# Patient Record
Sex: FEMALE | Race: OTHER | URBAN - METROPOLITAN AREA
[De-identification: names, ages, dates, MRNs, and addresses within clinical notes are randomized per-mention and may not be internally consistent; named-entity substitution may affect disease eponyms.]

---

## 2017-06-13 ENCOUNTER — EMERGENCY (EMERGENCY)
Facility: HOSPITAL | Age: 47
LOS: 1 days | Discharge: HOME | End: 2017-06-13
Attending: EMERGENCY MEDICINE | Admitting: EMERGENCY MEDICINE
Payer: COMMERCIAL

## 2017-06-13 VITALS
SYSTOLIC BLOOD PRESSURE: 123 MMHG | OXYGEN SATURATION: 100 % | DIASTOLIC BLOOD PRESSURE: 74 MMHG | TEMPERATURE: 99 F | HEART RATE: 94 BPM | RESPIRATION RATE: 18 BRPM

## 2017-06-13 VITALS
SYSTOLIC BLOOD PRESSURE: 120 MMHG | OXYGEN SATURATION: 96 % | RESPIRATION RATE: 16 BRPM | TEMPERATURE: 99 F | DIASTOLIC BLOOD PRESSURE: 76 MMHG | HEART RATE: 96 BPM

## 2017-06-13 PROCEDURE — 99284 EMERGENCY DEPT VISIT MOD MDM: CPT | Mod: 25

## 2017-06-13 PROCEDURE — 99284 EMERGENCY DEPT VISIT MOD MDM: CPT

## 2017-06-13 PROCEDURE — 93971 EXTREMITY STUDY: CPT | Mod: 26

## 2017-06-13 PROCEDURE — 93971 EXTREMITY STUDY: CPT

## 2017-06-13 RX ORDER — IBUPROFEN 200 MG
600 TABLET ORAL ONCE
Qty: 0 | Refills: 0 | Status: COMPLETED | OUTPATIENT
Start: 2017-06-13 | End: 2017-06-13

## 2017-06-13 RX ADMIN — Medication 600 MILLIGRAM(S): at 19:29

## 2017-06-13 NOTE — ED PROVIDER NOTE - OBJECTIVE STATEMENT
46 y.o. female hx of DVT and PE (unclear but states she thinks she may have a coagulation d/o) in 2014, no longer on anticoagulation p/w LLE swelling and pain. Patient states that since Friday she has had pain and decreased ROM of the left knee. She denies injury or trauma to the leg. Today she noticed pain in the calf area as well, and given her history she decided to come in. Pain occurs with walking. No recent travel or immobilization. Denies chest pain, SOB, fevers, chills.

## 2017-06-13 NOTE — ED ADULT NURSE NOTE - OBJECTIVE STATEMENT
46 year old female A&OX3 PMHX of DVT, PE, presents with left knee, calf, and ankle pain since Friday. Patient states that pain started in the knee on Friday and has begun to radiate down the leg. Patient endorses previous DVT's and PE's for which she has received a surgical filter. Patient endorses difficulty ambulating. Patient denies shortness of breath, chest pain, dizziness, weakness.

## 2017-06-13 NOTE — ED ADULT TRIAGE NOTE - CHIEF COMPLAINT QUOTE
pt states poor ROM to left knee 4 dys ago today tender and swelling left calf hx of DVT in that leg pt not on blood thinners

## 2017-06-13 NOTE — ED PROVIDER NOTE - PLAN OF CARE
1. Continue all home medications as previously prescribed. You will need to have a repeat ultrasound in 5-7 days.  2. Follow up with your Primary Care Physician as soon as possible for further evaluation.   3. Return to the Emergency Department for any concerning symptoms.

## 2017-06-13 NOTE — ED PROVIDER NOTE - MEDICAL DECISION MAKING DETAILS
Attending MD Trejo: 46F with history of DVT and PE presenting with atraumatic left leg swelling and knee pain, left calf with asymmetric swelling, concern for recurrent DVT. No clinical s/s concurrent however at this time, plan for US LLE to eval for recurrent DVT

## 2017-06-13 NOTE — ED PROVIDER NOTE - CARE PLAN
Principal Discharge DX:	Leg swelling  Instructions for follow-up, activity and diet:	1. Continue all home medications as previously prescribed. You will need to have a repeat ultrasound in 5-7 days.  2. Follow up with your Primary Care Physician as soon as possible for further evaluation.   3. Return to the Emergency Department for any concerning symptoms.

## 2017-06-13 NOTE — ED PROVIDER NOTE - ATTENDING CONTRIBUTION TO CARE
Attending MD Trejo:   I personally have seen and examined this patient.  Physician assistant note reviewed and agree on plan of care and except where noted.  See HPI, PE, and MDM for details.     Attending MD Trejo: A & O x 3, NAD, HEENT WNL and no facial asymmetry; lungs CTAB, heart with reg rhythm without murmur; abdomen soft NTND; extremities with mild left calf swelling, no calf ttp, negative bulmaro's sign, NV intact in b/l LEs; neuro exam non focal with no motor or sensory deficits.

## 2017-06-13 NOTE — ED PROVIDER NOTE - PROGRESS NOTE DETAILS
Attending MD Trejo: ED US negative for DVT. Will confirm findings with radiology US given high suspicion for DVT, if that is negative, patient will have repeat US performed in 5 days. Attending MD Roland: Patient signed out at 17:00 from Dr. Trejo pending official U/S.  Patient re-evaluated and doing well.  No acute issues at  this time.  Radiology tests reviewed with patient and family.  Patient stable for discharge.  Verbalized understanding of need for repeat U/S in 5 days. Follow up instructions given, importance of follow up emphasized, return to ED parameters reviewed and patient verbalized understanding.  All questions answered, all concerns addressed.

## 2017-06-13 NOTE — ED PROVIDER NOTE - PHYSICAL EXAMINATION
LLE: Mild swelling of the left knee and calf compared to the right side, prominence of the superficial vasculature, calf non-tender to palpation. knee is warm to palpation, ROM of the knee decreased due to pain. Neurovascularly intact distally.

## 2017-06-16 DIAGNOSIS — M79.662 PAIN IN LEFT LOWER LEG: ICD-10-CM

## 2017-06-16 DIAGNOSIS — M79.89 OTHER SPECIFIED SOFT TISSUE DISORDERS: ICD-10-CM

## 2017-06-16 DIAGNOSIS — F32.9 MAJOR DEPRESSIVE DISORDER, SINGLE EPISODE, UNSPECIFIED: ICD-10-CM

## 2020-02-27 ENCOUNTER — EMERGENCY (EMERGENCY)
Facility: HOSPITAL | Age: 50
LOS: 0 days | Discharge: HOME | End: 2020-02-27
Attending: EMERGENCY MEDICINE | Admitting: EMERGENCY MEDICINE
Payer: SUBSIDIZED

## 2020-02-27 VITALS
OXYGEN SATURATION: 97 % | TEMPERATURE: 100 F | DIASTOLIC BLOOD PRESSURE: 77 MMHG | HEIGHT: 65 IN | HEART RATE: 92 BPM | WEIGHT: 210.1 LBS | RESPIRATION RATE: 19 BRPM | SYSTOLIC BLOOD PRESSURE: 166 MMHG

## 2020-02-27 VITALS
SYSTOLIC BLOOD PRESSURE: 148 MMHG | HEART RATE: 85 BPM | TEMPERATURE: 98 F | OXYGEN SATURATION: 99 % | DIASTOLIC BLOOD PRESSURE: 68 MMHG | RESPIRATION RATE: 16 BRPM

## 2020-02-27 DIAGNOSIS — M79.605 PAIN IN LEFT LEG: ICD-10-CM

## 2020-02-27 DIAGNOSIS — Z79.899 OTHER LONG TERM (CURRENT) DRUG THERAPY: ICD-10-CM

## 2020-02-27 LAB
ALBUMIN SERPL ELPH-MCNC: 4.2 G/DL — SIGNIFICANT CHANGE UP (ref 3.5–5.2)
ALP SERPL-CCNC: 52 U/L — SIGNIFICANT CHANGE UP (ref 30–115)
ALT FLD-CCNC: 17 U/L — SIGNIFICANT CHANGE UP (ref 0–41)
ANION GAP SERPL CALC-SCNC: 15 MMOL/L — HIGH (ref 7–14)
APTT BLD: 29.9 SEC — SIGNIFICANT CHANGE UP (ref 27–39.2)
AST SERPL-CCNC: 36 U/L — SIGNIFICANT CHANGE UP (ref 0–41)
BASOPHILS # BLD AUTO: 0.07 K/UL — SIGNIFICANT CHANGE UP (ref 0–0.2)
BASOPHILS NFR BLD AUTO: 0.8 % — SIGNIFICANT CHANGE UP (ref 0–1)
BILIRUB SERPL-MCNC: <0.2 MG/DL — SIGNIFICANT CHANGE UP (ref 0.2–1.2)
BLD GP AB SCN SERPL QL: SIGNIFICANT CHANGE UP
BUN SERPL-MCNC: 18 MG/DL — SIGNIFICANT CHANGE UP (ref 10–20)
CALCIUM SERPL-MCNC: 9.3 MG/DL — SIGNIFICANT CHANGE UP (ref 8.5–10.1)
CHLORIDE SERPL-SCNC: 101 MMOL/L — SIGNIFICANT CHANGE UP (ref 98–110)
CO2 SERPL-SCNC: 21 MMOL/L — SIGNIFICANT CHANGE UP (ref 17–32)
CREAT SERPL-MCNC: 0.6 MG/DL — LOW (ref 0.7–1.5)
EOSINOPHIL # BLD AUTO: 0.09 K/UL — SIGNIFICANT CHANGE UP (ref 0–0.7)
EOSINOPHIL NFR BLD AUTO: 1 % — SIGNIFICANT CHANGE UP (ref 0–8)
GLUCOSE SERPL-MCNC: 112 MG/DL — HIGH (ref 70–99)
HCT VFR BLD CALC: 40 % — SIGNIFICANT CHANGE UP (ref 37–47)
HGB BLD-MCNC: 13.3 G/DL — SIGNIFICANT CHANGE UP (ref 12–16)
IMM GRANULOCYTES NFR BLD AUTO: 0.3 % — SIGNIFICANT CHANGE UP (ref 0.1–0.3)
INR BLD: 0.99 RATIO — SIGNIFICANT CHANGE UP (ref 0.65–1.3)
LYMPHOCYTES # BLD AUTO: 2.04 K/UL — SIGNIFICANT CHANGE UP (ref 1.2–3.4)
LYMPHOCYTES # BLD AUTO: 23.6 % — SIGNIFICANT CHANGE UP (ref 20.5–51.1)
MCHC RBC-ENTMCNC: 31.1 PG — HIGH (ref 27–31)
MCHC RBC-ENTMCNC: 33.3 G/DL — SIGNIFICANT CHANGE UP (ref 32–37)
MCV RBC AUTO: 93.5 FL — SIGNIFICANT CHANGE UP (ref 81–99)
MONOCYTES # BLD AUTO: 0.77 K/UL — HIGH (ref 0.1–0.6)
MONOCYTES NFR BLD AUTO: 8.9 % — SIGNIFICANT CHANGE UP (ref 1.7–9.3)
NEUTROPHILS # BLD AUTO: 5.66 K/UL — SIGNIFICANT CHANGE UP (ref 1.4–6.5)
NEUTROPHILS NFR BLD AUTO: 65.4 % — SIGNIFICANT CHANGE UP (ref 42.2–75.2)
NRBC # BLD: 0 /100 WBCS — SIGNIFICANT CHANGE UP (ref 0–0)
PLATELET # BLD AUTO: 289 K/UL — SIGNIFICANT CHANGE UP (ref 130–400)
POTASSIUM SERPL-MCNC: 6.4 MMOL/L — CRITICAL HIGH (ref 3.5–5)
POTASSIUM SERPL-SCNC: 6.4 MMOL/L — CRITICAL HIGH (ref 3.5–5)
PROT SERPL-MCNC: 7.3 G/DL — SIGNIFICANT CHANGE UP (ref 6–8)
PROTHROM AB SERPL-ACNC: 11.4 SEC — SIGNIFICANT CHANGE UP (ref 9.95–12.87)
RBC # BLD: 4.28 M/UL — SIGNIFICANT CHANGE UP (ref 4.2–5.4)
RBC # FLD: 13.2 % — SIGNIFICANT CHANGE UP (ref 11.5–14.5)
SODIUM SERPL-SCNC: 137 MMOL/L — SIGNIFICANT CHANGE UP (ref 135–146)
WBC # BLD: 8.66 K/UL — SIGNIFICANT CHANGE UP (ref 4.8–10.8)
WBC # FLD AUTO: 8.66 K/UL — SIGNIFICANT CHANGE UP (ref 4.8–10.8)

## 2020-02-27 PROCEDURE — 99284 EMERGENCY DEPT VISIT MOD MDM: CPT

## 2020-02-27 PROCEDURE — 99053 MED SERV 10PM-8AM 24 HR FAC: CPT

## 2020-02-27 RX ORDER — KETOROLAC TROMETHAMINE 30 MG/ML
1 SYRINGE (ML) INJECTION
Qty: 20 | Refills: 0
Start: 2020-02-27 | End: 2020-03-02

## 2020-02-27 RX ORDER — METHOCARBAMOL 500 MG/1
1000 TABLET, FILM COATED ORAL ONCE
Refills: 0 | Status: COMPLETED | OUTPATIENT
Start: 2020-02-27 | End: 2020-02-27

## 2020-02-27 RX ORDER — KETOROLAC TROMETHAMINE 30 MG/ML
15 SYRINGE (ML) INJECTION ONCE
Refills: 0 | Status: DISCONTINUED | OUTPATIENT
Start: 2020-02-27 | End: 2020-02-27

## 2020-02-27 RX ORDER — ACETAMINOPHEN 500 MG
650 TABLET ORAL ONCE
Refills: 0 | Status: COMPLETED | OUTPATIENT
Start: 2020-02-27 | End: 2020-02-27

## 2020-02-27 RX ORDER — METHOCARBAMOL 500 MG/1
2 TABLET, FILM COATED ORAL
Qty: 40 | Refills: 0
Start: 2020-02-27 | End: 2020-03-02

## 2020-02-27 RX ADMIN — Medication 15 MILLIGRAM(S): at 04:48

## 2020-02-27 RX ADMIN — Medication 650 MILLIGRAM(S): at 04:48

## 2020-02-27 RX ADMIN — METHOCARBAMOL 1000 MILLIGRAM(S): 500 TABLET, FILM COATED ORAL at 04:48

## 2020-02-27 NOTE — ED PROVIDER NOTE - NSFOLLOWUPINSTRUCTIONS_ED_ALL_ED_FT
Neuropathic Pain    Neuropathic pain is pain caused by damage to the nerves that are responsible for certain sensations in your body (sensory nerves). The pain can be caused by:  Damage to the sensory nerves that send signals to your spinal cord and brain (peripheral nervous system).  Damage to the sensory nerves in your brain or spinal cord (central nervous system).  Neuropathic pain can make you more sensitive to pain. Even a minor sensation can feel very painful. This is usually a long-term condition that can be difficult to treat. The type of pain differs from person to person. It may:  Start suddenly (acute), or it may develop slowly and last for a long time (chronic).  Come and go as damaged nerves heal, or it may stay at the same level for years.  Cause emotional distress, loss of sleep, and a lower quality of life.  What are the causes?  The most common cause of this condition is diabetes. Many other diseases and conditions can also cause neuropathic pain. Causes of neuropathic pain can be classified as:  Toxic. This is caused by medicines and chemicals. The most common cause of toxic neuropathic pain is damage from cancer treatments (chemotherapy).  Metabolic. This can be caused by:  Diabetes. This is the most common disease that damages the nerves.  Lack of vitamin B from long-term alcohol abuse.  Traumatic. Any injury that cuts, crushes, or stretches a nerve can cause damage and pain. A common example is feeling pain after losing an arm or leg (phantom limb pain).  Compression-related. If a sensory nerve gets trapped or compressed for a long period of time, the blood supply to the nerve can be cut off.  Vascular. Many blood vessel diseases can cause neuropathic pain by decreasing blood supply and oxygen to nerves.  Autoimmune. This type of pain results from diseases in which the body's defense system (immune system) mistakenly attacks sensory nerves. Examples of autoimmune diseases that can cause neuropathic pain include lupus and multiple sclerosis.  Infectious. Many types of viral infections can damage sensory nerves and cause pain. Shingles infection is a common cause of this type of pain.  Inherited. Neuropathic pain can be a symptom of many diseases that are passed down through families (genetic).  What increases the risk?  You are more likely to develop this condition if:  You have diabetes.  You smoke.  You drink too much alcohol.  You are taking certain medicines, including medicines that kill cancer cells (chemotherapy) or that treat immune system disorders.  What are the signs or symptoms?  The main symptom is pain. Neuropathic pain is often described as:  Burning.  Shock-like.  Stinging.  Hot or cold.  Itching.  How is this diagnosed?  No single test can diagnose neuropathic pain. It is diagnosed based on:  Physical exam and your symptoms. Your health care provider will ask you about your pain. You may be asked to use a pain scale to describe how bad your pain is.  Tests. These may be done to see if you have a high sensitivity to pain and to help find the cause and location of any sensory nerve damage. They include:  Nerve conduction studies to test how well nerve signals travel through your sensory nerves (electrodiagnostic testing).  Stimulating your sensory nerves through electrodes on your skin and measuring the response in your spinal cord and brain (somatosensory evoked potential).  Imaging studies, such as:  X-rays.  CT scan.  MRI.  How is this treated?  Treatment for neuropathic pain may change over time. You may need to try different treatment options or a combination of treatments. Some options include:  Treating the underlying cause of the neuropathy, such as diabetes, kidney disease, or vitamin deficiencies.  Stopping medicines that can cause neuropathy, such as chemotherapy.  Medicine to relieve pain. Medicines may include:  Prescription or over-the-counter pain medicine.  Anti-seizure medicine.  Antidepressant medicines.  Pain-relieving patches that are applied to painful areas of skin.  A medicine to numb the area (local anesthetic), which can be injected as a nerve block.  Transcutaneous nerve stimulation. This uses electrical currents to block painful nerve signals. The treatment is painless.  Alternative treatments, such as:  Acupuncture.  Meditation.  Massage.  Physical therapy.  Pain management programs.  Counseling.  Follow these instructions at home:  Medicines     Image   Take over-the-counter and prescription medicines only as told by your health care provider.  Do not drive or use heavy machinery while taking prescription pain medicine.  If you are taking prescription pain medicine, take actions to prevent or treat constipation. Your health care provider may recommend that you:   Drink enough fluid to keep your urine pale yellow.   Eat foods that are high in fiber, such as fresh fruits and vegetables, whole grains, and beans.   Limit foods that are high in fat and processed sugars, such as fried or sweet foods.   Take an over-the-counter or prescription medicine for constipation.  Lifestyle       Have a good support system at home.  Consider joining a chronic pain support group.  Do not use any products that contain nicotine or tobacco, such as cigarettes and e-cigarettes. If you need help quitting, ask your health care provider.  Do not drink alcohol.  General instructions     Learn as much as you can about your condition.  Work closely with all your health care providers to find the treatment plan that works best for you.  Ask your health care provider what activities are safe for you.  Keep all follow-up visits as told by your health care provider. This is important.  Contact a health care provider if:  Your pain treatments are not working.  You are having side effects from your medicines.  You are struggling with tiredness (fatigue), mood changes, depression, or anxiety.  Summary  Neuropathic pain is pain caused by damage to the nerves that are responsible for certain sensations in your body (sensory nerves).  Neuropathic pain may come and go as damaged nerves heal, or it may stay at the same level for years.  Neuropathic pain is usually a long-term condition that can be difficult to treat. Consider joining a chronic pain support group.  This information is not intended to replace advice given to you by your health care provider. Make sure you discuss any questions you have with your health care provider.

## 2020-02-27 NOTE — ED PROVIDER NOTE - NSFOLLOWUPCLINICS_GEN_ALL_ED_FT
[facility]  Neurology  [address] [place] [address], [facility] [address]  [place] [address]  Phone: [phone]  Fax:   Follow Up Time: 1-3 Days

## 2020-02-27 NOTE — ED ADULT NURSE NOTE - CHPI ED NUR SYMPTOMS NEG
no dizziness/no decreased eating/drinking/no fever/no tingling/no vomiting/no chills/no nausea/no weakness

## 2020-02-27 NOTE — ED PROVIDER NOTE - OBJECTIVE STATEMENT
48 y/o female with PMH of PE/DVT 5 years ago after surgery (treated with Xarelto/IVC filter x6 months no AC since) who presents to ED for pain in the left leg 1 day. Pt does not like to take medications so came to ED for pain. Pt was concerned she had a blood clot prompting ED visit. No chest pain or SOB. No weakness, numbness.

## 2020-02-27 NOTE — ED PROVIDER NOTE - CLINICAL SUMMARY MEDICAL DECISION MAKING FREE TEXT BOX
pt with hx of dvt co left thigh/inguinal pain. sharp radiates from upper thigh down bella-lateral thigh. no trauma. no sob or cp. no leg swelling. no calf pain.  pt in nad, cta, rrr, ab soft, nt. spine nt, no cvat. no rash. leg nt. no edema. nml pulses, cap refill < 2 sec. no cyanosis. ddimer.

## 2020-02-27 NOTE — ED PROVIDER NOTE - PHYSICAL EXAMINATION
CONSTITUTIONAL: Well-developed; well-nourished; in no acute distress.   SKIN: warm, dry  HEAD: Normocephalic; atraumatic.  EYES: no conjunctival injection. PERRL.   ENT: No nasal discharge; airway clear.  NECK: Supple; non tender.  CARD: S1, S2 normal; no murmurs, gallops, or rubs. Regular rate and rhythm.   RESP: No wheezes, rales or rhonchi.  ABD: soft ntnd  EXT: Normal ROM.  No clubbing, cyanosis or edema.   BACK: + SLR on the elft  LYMPH: No acute cervical adenopathy.  NEURO: Alert, oriented, grossly unremarkable  PSYCH: Cooperative, appropriate.

## 2020-06-10 ENCOUNTER — EMERGENCY (EMERGENCY)
Facility: HOSPITAL | Age: 50
LOS: 0 days | Discharge: HOME | End: 2020-06-10
Attending: EMERGENCY MEDICINE | Admitting: EMERGENCY MEDICINE
Payer: COMMERCIAL

## 2020-06-10 VITALS
SYSTOLIC BLOOD PRESSURE: 150 MMHG | HEIGHT: 65 IN | RESPIRATION RATE: 18 BRPM | OXYGEN SATURATION: 97 % | DIASTOLIC BLOOD PRESSURE: 70 MMHG | HEART RATE: 82 BPM | TEMPERATURE: 96 F | WEIGHT: 218.04 LBS

## 2020-06-10 DIAGNOSIS — Z98.890 OTHER SPECIFIED POSTPROCEDURAL STATES: ICD-10-CM

## 2020-06-10 DIAGNOSIS — R29.810 FACIAL WEAKNESS: ICD-10-CM

## 2020-06-10 DIAGNOSIS — H92.01 OTALGIA, RIGHT EAR: ICD-10-CM

## 2020-06-10 DIAGNOSIS — H92.09 OTALGIA, UNSPECIFIED EAR: ICD-10-CM

## 2020-06-10 DIAGNOSIS — Z98.890 OTHER SPECIFIED POSTPROCEDURAL STATES: Chronic | ICD-10-CM

## 2020-06-10 PROCEDURE — 99284 EMERGENCY DEPT VISIT MOD MDM: CPT

## 2020-06-10 PROCEDURE — 70450 CT HEAD/BRAIN W/O DYE: CPT | Mod: 26

## 2020-06-10 RX ORDER — ARIPIPRAZOLE 15 MG/1
0 TABLET ORAL
Qty: 0 | Refills: 0 | DISCHARGE

## 2020-06-10 RX ORDER — FLUOXETINE HCL 10 MG
0 CAPSULE ORAL
Qty: 0 | Refills: 0 | DISCHARGE

## 2020-06-10 NOTE — ED PROVIDER NOTE - PATIENT PORTAL LINK FT
You can access the FollowMyHealth Patient Portal offered by [facility] by registering at the following website: [email or web address]. By joining [employer]’s FollowMyHealth portal, you will also be able to view your health information using other applications (apps) compatible with our system.

## 2020-06-10 NOTE — ED PROVIDER NOTE - OBJECTIVE STATEMENT
50 y/o female with a PMH of premature ovarian failure, PE and DVT s/p surgery and on bc (no longer on blood thinners, IVC filter removed) presents to the ED for evaluation of intermittent sharp right ear pain radiating to the right side of the head, right eye tearing, associated with twitch of the right cheek, lip, and chin x 6 days. Pt states pain resolves with tylenol. pt admits to seeing neurologist Dr. Singh on monday 06/01/2020 and pt is schedule for mri of the brain on 06/12/2020. pt is not currently in pain, took two tylenol prior to arrival. Pt denies head injury, eye pain, eye swelling, decrease hearing, fever, chills, feeling off balance, numbness, tingling, neck pain, rash, hx of stroke, or n/v/d.

## 2020-06-10 NOTE — ED PROVIDER NOTE - CARE PROVIDER_API CALL
gurpreet kelsey  [address] [place] [address]  Phone: [phone]  Fax: (   )    -  Follow Up Time: Urgent

## 2020-06-10 NOTE — ED PROVIDER NOTE - CLINICAL SUMMARY MEDICAL DECISION MAKING FREE TEXT BOX
48yo F presents for ear pain and facial weakness for 1 week. Only notable neuro deficit is right lower lip weakness. CTH negative. Pt scheduled for MRI in 2 days.  D/w neuro Dr. Rajesh Valdez, agrees pt stable to continue to f/u with her neurologist as outpatient

## 2020-06-10 NOTE — ED PROVIDER NOTE - PHYSICAL EXAMINATION
Physical Exam    Vital Signs: I have reviewed the initial vital signs.  Constitutional: well-nourished, appears stated age, no acute distress  Eyes: Conjunctiva pink, Sclera clear, PERRLA, EOMI without pain.  ENT: b/l TM intact without erythema, or bulging. no auricular tenderness no surrounding erythema or edema.   Cardiovascular: regular rate, regular rhythm, well-perfused extremities, radial pulses equal and 2+ b/l.   Respiratory: unlabored respiratory effort, clear to auscultation bilaterally no wheezing, rales and rhonchi. pt is speaking full sentences. no accessory muscle use.   Integumentary: warm, dry, no rash  Neurologic: skull atraumatic normocephalic. right lower lip weakness with smiling. awake, alert, cranial nerves II-XII grossly intact, extremities’ motor and sensory functions grossly intact. finger to nose intact. negative pronator drift. negative romberg. steady gait.

## 2020-06-10 NOTE — ED ADULT NURSE NOTE - NSIMPLEMENTINTERV_GEN_ALL_ED
Implemented All Universal Safety Interventions:  [place] to call system. Call bell, personal items and telephone within reach. Instruct patient to call for assistance. Room bathroom lighting operational. Non-slip footwear when patient is off stretcher. Physically safe environment: no spills, clutter or unnecessary equipment. Stretcher in lowest position, wheels locked, appropriate side rails in place.

## 2020-06-10 NOTE — ED PROVIDER NOTE - NS ED ROS FT
CONST: No fever, chills or bodyaches  EYES: No pain, redness, drainage or visual changes.  ENT: (+) right ear pain. no ear discharge, nasal discharge or congestion. No sore throat  CARD: No chest pain, palpitations  RESP: No SOB, cough, hemoptysis. No hx of asthma or COPD  GI: No abdominal pain, N/V/D  : No urinary symptoms  MS: No joint pain, back pain or extremity pain/injury  SKIN: No rashes  NEURO: (+) right head pain, twitching of the right eye, check, and lip. No dizziness, paresthesias or LOC

## 2020-06-10 NOTE — ED PROVIDER NOTE - ATTENDING CONTRIBUTION TO CARE
48yo F with PMHx DVT/PE (2/2 OCP use/post-op complication, no longer on AC, IVC filter removed), p/w right ear pain for 1 week assoc with right eye tearing, and muscle twitches of right cheek and right lower lip weakness. Pt saw neuro recently, scheduled for MRI in 2 days, however concerned about symptoms so came to ED for eval. Denies fever/chills, headache, lightheadedness, dizziness, vertigo, blurry vision, hearing change, CP, SOB, cough, nausea, vomiting, abd pain, dysuria, leg swelling. Denies numbness, tingling, weakness. Denies slurred speech. Denies numbness, tingling, weakness in extremities.     Vital signs reviewed  GENERAL: Patient nontoxic appearing, NAD  HEAD: NCAT  EYES: Anicteric  ENT: MMM. TMs normal, no erythema, dullness, bulging.   NECK: Supple, non tender, normal ROM  RESPIRATORY: Normal respiratory effort. CTA B/L. No wheezing, rales, rhonchi  CARDIOVASCULAR: Regular rate and rhythm  ABDOMEN: Soft. Nondistended. Nontender. No guarding or rebound.   MUSCULOSKELETAL/EXTREMITIES: Brisk cap refill. Equal radial pulses. No leg edema.  SKIN:  Warm and dry  NEURO: AAOx3. GCS 15. Speech clear and coherent. Answering questions appropriately. Face symmetric, no facial droop. Slight right lower lip weakness, noticeable when asking patient to smile or when puffing out cheeks. Strength 5/5 x4, no drift. Normal finger-to-nose. No dysmetria. Ambulating normally, no gait abnormality.

## 2020-06-10 NOTE — ED PROVIDER NOTE - PROVIDER TOKENS
FREE:[LAST:[laure],FIRST:[gurpreet],PHONE:[phone]],FAX:[(   )    -],ADDRESS:[[address] [place] [address]],FOLLOWUP:[Urgent]]

## 2020-06-10 NOTE — ED PROVIDER NOTE - NSFOLLOWUPINSTRUCTIONS_ED_ALL_ED_FT
Earache    WHAT YOU NEED TO KNOW:    An earache can be caused by a problem within your ear or from another body area. Common causes include earwax buildup, objects in your ear, injury, infections, or jaw or dental problems. Less often, earaches may be caused by arthritis in your upper spine.    DISCHARGE INSTRUCTIONS:    Return to the emergency department if:     You have a severe earache.      You have ear pain with itching, hearing loss, dizziness, a feeling of fullness in your ear, or ringing in your ears.    Contact your healthcare provider if:     Your ear pain worsens or does not go away with treatment.      You have drainage from your ear.      You have a fever.       Your outer ear becomes red, swollen, and warm.      You have questions or concerns about your condition or care.    Medicines: You may need any of the following:     NSAIDs, such as ibuprofen, help decrease swelling, pain, and fever. This medicine is available with or without a doctor's order. NSAIDs can cause stomach bleeding or kidney problems in certain people. If you take blood thinner medicine, always ask if NSAIDs are safe for you. Always read the medicine label and follow directions. Do not give these medicines to children under 6 months of age without direction from your child's healthcare provider.      Acetaminophen decreases pain and fever. It is available without a doctor's order. Ask how much to take and how often to take it. Follow directions. Acetaminophen can cause liver damage if not taken correctly.      Do not give aspirin to children under 18 years of age. Your child could develop Reye syndrome if he takes aspirin. Reye syndrome can cause life-threatening brain and liver damage. Check your child's medicine labels for aspirin, salicylates, or oil of wintergreen.       Take your medicine as directed. Call your healthcare provider if you think your medicine is not helping or if you have side effects. Tell him if you are allergic to any medicine. Keep a list of the medicines, vitamins, and herbs you take. Include the amounts, and when and why you take them. Bring the list or the pill bottles to follow-up visits. Carry your medicine list with you in case of an emergency.    Follow up with your healthcare provider as directed: Write down your questions so you remember to ask them during your visits.       © Copyright [employer] 2019 All illustrations and images included in CareNotes are the copyrighted property of [employer] Inc[employer] or [employer].    Weakness    Weakness is a lack of strength. It may be felt all over the body (generalized) or in one specific part of the body (focal). Some causes of weakness can be serious. You may need further medical evaluation, especially if you are elderly or you have a history of immunosuppression (such as chemotherapy or HIV), kidney disease, heart disease, or diabetes.     CAUSES  Weakness can be caused by many different things, including:    Infection.  Physical exhaustion.  Internal bleeding or other blood loss that results in a lack of red blood cells (anemia).  Dehydration. This cause is more common in elderly people.  Side effects or electrolyte abnormalities from medicines, such as pain medicines or sedatives.  Emotional distress, anxiety, or depression.  Circulation problems, especially severe peripheral arterial disease.  Heart disease, such as rapid atrial fibrillation, bradycardia, or heart failure.  Nervous system disorders, such as Guillain-Barré syndrome, multiple sclerosis, or stroke.    DIAGNOSIS  To find the cause of your weakness, your caregiver will take your history and perform a physical exam. Lab tests or X-rays may also be ordered, if needed.    TREATMENT  Treatment of weakness depends on the cause of your symptoms and can vary greatly.    HOME CARE INSTRUCTIONS  Rest as needed.  Eat a well-balanced diet.  Try to get some exercise every day.  Only take over-the-counter or prescription medicines as directed by your caregiver.    SEEK MEDICAL CARE IF:  Your weakness seems to be getting worse or spreads to other parts of your body.  You develop new aches or pains.    SEEK IMMEDIATE MEDICAL CARE IF:  You cannot perform your normal daily activities, such as getting dressed and feeding yourself.  You cannot walk up and down stairs, or you feel exhausted when you do so.  You have shortness of breath or chest pain.  You have difficulty moving parts of your body.   You have weakness in only one area of the body or on only one side of the body.  You have a fever.  You have trouble speaking or swallowing.  You cannot control your bladder or bowel movements.  You have black or bloody vomit or stools.    MAKE SURE YOU:  Understand these instructions.  Will watch your condition.  Will get help right away if you are not doing well or get worse.    ADDITIONAL NOTES AND INSTRUCTIONS    Please follow up with your Primary MD in 24-48 hr.  Seek immediate medical care for any new/worsening signs or symptoms.

## 2020-06-10 NOTE — ED PROVIDER NOTE - PROGRESS NOTE DETAILS
dr. gaston number is [phone] spoke with dr. gaston answering service, dr francis is on spoke with dr. gaston answering service, dr francis is on call and states they do not cover [facility] need to call our own neuro team. dr. kristi feliciano, states pt can be discharged and get schedule mri this friday 06/12/2020. supervised care of this patient

## 2021-03-22 NOTE — ED ADULT NURSE NOTE - NSFALLRSKINDICATORS_ED_ALL_ED
Iza from [employer] contacted and spoke to patient and let her know that brace would be in sometime this week  Iza will call patient when brace arrives  no

## 2021-07-31 NOTE — ED ADULT TRIAGE NOTE - WEIGHT IN KG
Patient in stretcher resting comfortably with no needs. Breathing easy and unlabored. 1:1 continuous monitoring with lexie GARIBAY      98.9

## 2021-11-02 NOTE — ED ADULT TRIAGE NOTE - BP NONINVASIVE DIASTOLIC (MM HG)
70
no dermatitis, no environmental allergies, no food allergies, no immunosuppressive disorder, and no pruritus.

## 2023-05-17 NOTE — ED ADULT NURSE NOTE - CAS DISCH CONDITION
[FreeTextEntry1] : 5/21/19: L FNA 11:00 4FN: proliferative breast lesion, favoring fibroadenoma\par \par 1/29/21: Goran DXMG & US ([facility]): heterogeneously dense, L - 4cm oval mass UOQ at site of palpable lump, US - R - 0.8cm cluster of cysts 10:00 5FN, 0.9cm oval hypoechoic mass or complicated cyst 11:00 1FN, L - 0.8cm hypoechoic oval mass 12:00 4FN, 0.5cm cluster of cysts 12:00 4FN, 4.3cm lobulated hypoechoic mass w/ parallel orientation 1:00 5FN, 0.8cm oval hypoechoic mass 2:00 7FN. 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  Significant background enhancement with several foci of enhancement with type III kinetics.  Given otherwise benign appearance, recommend short interval FU in 6 months; probably benign.\par \par 4/26/22 B/L MMG/US ([facility]): No mammographic or US evidence of malignancy. BIRADS 2: Benign. Follow up with annual screening.\par \par 10/24/22 B/L MRI ([facility]): Clumped linear nonmass enhancement in both breasts, upper central.  \par Recommend biopsy be scheduled between day 7 and day 14 of patient's menstrual cycle. If the findings are not reproduced at correct timing then biopsy will be canceled. BIRADS 4. \par \par 11/4/23 MR Biopsy ([facility]): CANCELLED. A 2 site MR biopsies not performed secondary to lack of suspicious findings within the upper central breasts bilaterally. A bilateral breast MRI with contrast in 6 months recommended to assess these areas as well as the stable, likely areas of background parenchymal enhancement, identified on prior MRIs.\par \par 5/16/23 B/L sMMG/US ([facility]): No mammographic or US evidence of malignancy. Follow up with annual screening. BIRADS 2.
Stable

## 2023-12-28 NOTE — ED PROVIDER NOTE - GASTROINTESTINAL NEGATIVE STATEMENT, MLM
no abdominal pain, no bloating, no constipation, no diarrhea, no nausea and no vomiting.
(3) walks occasionally

## 2025-04-02 NOTE — ED ADULT TRIAGE NOTE - PAIN RATING/NUMBER SCALE (0-10): REST
Alert-The patient is alert, awake and responds to voice. The patient is oriented to time, place, and person. The triage nurse is able to obtain subjective information. 6